# Patient Record
Sex: MALE | Race: WHITE | Employment: FULL TIME | URBAN - METROPOLITAN AREA
[De-identification: names, ages, dates, MRNs, and addresses within clinical notes are randomized per-mention and may not be internally consistent; named-entity substitution may affect disease eponyms.]

---

## 2017-01-24 ENCOUNTER — OFFICE VISIT (OUTPATIENT)
Dept: FAMILY MEDICINE CLINIC | Facility: CLINIC | Age: 42
End: 2017-01-24

## 2017-01-24 VITALS
HEART RATE: 72 BPM | DIASTOLIC BLOOD PRESSURE: 74 MMHG | RESPIRATION RATE: 12 BRPM | HEIGHT: 73 IN | WEIGHT: 217 LBS | SYSTOLIC BLOOD PRESSURE: 120 MMHG | BODY MASS INDEX: 28.76 KG/M2

## 2017-01-24 DIAGNOSIS — R42 VERTIGO: ICD-10-CM

## 2017-01-24 DIAGNOSIS — F43.9 STRESS: ICD-10-CM

## 2017-01-24 DIAGNOSIS — E78.00 PURE HYPERCHOLESTEROLEMIA: Primary | ICD-10-CM

## 2017-01-24 PROCEDURE — 99213 OFFICE O/P EST LOW 20 MIN: CPT | Performed by: NURSE PRACTITIONER

## 2017-01-24 RX ORDER — ATORVASTATIN CALCIUM 20 MG/1
TABLET, FILM COATED ORAL
Qty: 90 TABLET | Refills: 3 | Status: SHIPPED | OUTPATIENT
Start: 2017-01-24 | End: 2017-12-15

## 2017-01-24 NOTE — PROGRESS NOTES
Robert Mc is a 35year old male who presents for recheck of hyperlipidemia. Patient reports taking medications as instructed, no medication side effects noted. Denies any generalized muscle aches.  C/o intermittent vertigo over the past 1 month witho Wt 217 lb  BMI 28.64 kg/m2  GENERAL HEALTH: feels well otherwise  HEENT: see HPI, denies runny nose, nasal congestion or PND, no ear pain  RESPIRATORY: denies shortness of breath with exertion  CARDIOVASCULAR: denies chest pain on exertion, denies palpita benefits like:  · Decreasing your risk of health problems, such as heart disease, high blood pressure, diabetes, and some types of cancer. · Managing your weight. · Helping you sleep better.   · Preventing or relieving stress, depression, and back problem

## 2017-01-24 NOTE — PATIENT INSTRUCTIONS
Monitor for worsening vertigo symptoms. Consider ENT or neurology consult. Labs reviewed, stable. Resume exercise, try to get 7 hours of sleep night. Recommend annual physical next Fall.       Exercise: Why Fitness Matters  Starting an exercise program c

## 2017-02-21 ENCOUNTER — APPOINTMENT (OUTPATIENT)
Dept: OTHER | Facility: HOSPITAL | Age: 42
End: 2017-02-21
Attending: PREVENTIVE MEDICINE

## 2017-04-04 DIAGNOSIS — E78.00 PURE HYPERCHOLESTEROLEMIA: Primary | ICD-10-CM

## 2017-04-05 RX ORDER — ATORVASTATIN CALCIUM 20 MG/1
TABLET, FILM COATED ORAL
Qty: 90 TABLET | Refills: 0 | Status: SHIPPED | OUTPATIENT
Start: 2017-04-05 | End: 2017-07-19

## 2017-07-19 DIAGNOSIS — E78.00 PURE HYPERCHOLESTEROLEMIA: ICD-10-CM

## 2017-07-19 RX ORDER — ATORVASTATIN CALCIUM 20 MG/1
TABLET, FILM COATED ORAL
Qty: 90 TABLET | Refills: 1 | Status: SHIPPED | OUTPATIENT
Start: 2017-07-19 | End: 2017-12-15

## 2017-11-02 ENCOUNTER — APPOINTMENT (OUTPATIENT)
Dept: OTHER | Facility: HOSPITAL | Age: 42
End: 2017-11-02
Attending: PREVENTIVE MEDICINE

## 2017-12-04 ENCOUNTER — TELEPHONE (OUTPATIENT)
Dept: FAMILY MEDICINE CLINIC | Facility: CLINIC | Age: 42
End: 2017-12-04

## 2017-12-04 DIAGNOSIS — E78.00 PURE HYPERCHOLESTEROLEMIA: Primary | ICD-10-CM

## 2017-12-15 ENCOUNTER — OFFICE VISIT (OUTPATIENT)
Dept: FAMILY MEDICINE CLINIC | Facility: CLINIC | Age: 42
End: 2017-12-15

## 2017-12-15 VITALS
TEMPERATURE: 98 F | SYSTOLIC BLOOD PRESSURE: 118 MMHG | RESPIRATION RATE: 16 BRPM | DIASTOLIC BLOOD PRESSURE: 80 MMHG | WEIGHT: 217.38 LBS | HEIGHT: 73 IN | HEART RATE: 76 BPM | BODY MASS INDEX: 28.81 KG/M2

## 2017-12-15 DIAGNOSIS — Z00.00 ANNUAL PHYSICAL EXAM: Primary | ICD-10-CM

## 2017-12-15 DIAGNOSIS — M25.521 RIGHT ELBOW PAIN: ICD-10-CM

## 2017-12-15 DIAGNOSIS — E78.00 PURE HYPERCHOLESTEROLEMIA: ICD-10-CM

## 2017-12-15 PROCEDURE — 99396 PREV VISIT EST AGE 40-64: CPT | Performed by: FAMILY MEDICINE

## 2017-12-15 RX ORDER — ATORVASTATIN CALCIUM 20 MG/1
TABLET, FILM COATED ORAL
Qty: 90 TABLET | Refills: 3 | Status: SHIPPED | OUTPATIENT
Start: 2017-12-15 | End: 2018-12-21

## 2017-12-15 NOTE — PROGRESS NOTES
Patient presents with:  Physical: Here for annual exam and to review recent labs. HPI:   Familia Mcpherson is a 38year old male who presents for a complete physical exam. No significant concerns today, but R arm not feeling good for the last month.   Jonathon Rajan History:   Diagnosis Date   • Acute URI    • Internal hemorrhoids    • Kidney calculi 6/2015   • Lipid screening 2/25/2012    Done      Past Surgical History:  2007: COLONOSCOPY      Comment: dr. Isauro Cassidy, repeat at age 46   Family History   Problem Relation A PLAN:     Anna Harvey was seen in the office today:  had concerns including Physical (Here for annual exam and to review recent labs. ). 1. Annual physical exam  Overall healthy  Clean up diet some, regular exercise encouraged.   Goal weight is a li

## 2017-12-21 ENCOUNTER — HOSPITAL ENCOUNTER (OUTPATIENT)
Dept: GENERAL RADIOLOGY | Age: 42
Discharge: HOME | End: 2017-12-21
Attending: FAMILY MEDICINE
Payer: COMMERCIAL

## 2017-12-21 DIAGNOSIS — M25.521 RIGHT ELBOW PAIN: ICD-10-CM

## 2017-12-21 PROCEDURE — 73080 X-RAY EXAM OF ELBOW: CPT | Performed by: FAMILY MEDICINE

## 2018-11-28 ENCOUNTER — TELEPHONE (OUTPATIENT)
Dept: FAMILY MEDICINE CLINIC | Facility: CLINIC | Age: 43
End: 2018-11-28

## 2018-11-28 DIAGNOSIS — E78.00 PURE HYPERCHOLESTEROLEMIA: Primary | ICD-10-CM

## 2018-11-29 NOTE — TELEPHONE ENCOUNTER
LM for patient fasting lab orders, 10-12 hours, water only have been sent to [facility], please have them prior to your appt.

## 2018-12-21 ENCOUNTER — OFFICE VISIT (OUTPATIENT)
Dept: FAMILY MEDICINE CLINIC | Facility: CLINIC | Age: 43
End: 2018-12-21
Payer: COMMERCIAL

## 2018-12-21 VITALS
RESPIRATION RATE: 14 BRPM | TEMPERATURE: 98 F | SYSTOLIC BLOOD PRESSURE: 110 MMHG | HEART RATE: 76 BPM | DIASTOLIC BLOOD PRESSURE: 72 MMHG | HEIGHT: 73 IN | BODY MASS INDEX: 28.89 KG/M2 | WEIGHT: 218 LBS

## 2018-12-21 DIAGNOSIS — Z00.00 ANNUAL PHYSICAL EXAM: Primary | ICD-10-CM

## 2018-12-21 DIAGNOSIS — E78.00 PURE HYPERCHOLESTEROLEMIA: ICD-10-CM

## 2018-12-21 PROCEDURE — 99396 PREV VISIT EST AGE 40-64: CPT | Performed by: FAMILY MEDICINE

## 2018-12-21 RX ORDER — ATORVASTATIN CALCIUM 20 MG/1
TABLET, FILM COATED ORAL
Qty: 90 TABLET | Refills: 3 | Status: SHIPPED | OUTPATIENT
Start: 2018-12-21 | End: 2019-12-23

## 2018-12-21 NOTE — PROGRESS NOTES
Patient presents with:  Physical     HPI:   Lee Staton is a 37year old male who presents for a complete physical exam.     Last colonoscopy:  Has seen GI - recommend at 46. Last PSA:  n/a  Immunizations: TDaP 2015. HLD - on atorvastatin.    Re LBP[other]) Mother       Social History:  Social History    Tobacco Use      Smoking status: Never Smoker      Smokeless tobacco: Never Used    Alcohol use: Yes      Frequency: 2-4 times a month      Drinks per session: 1 or 2      Binge frequency: Never improved  Cholesterol controlled. Continue med. - atorvastatin 20 MG Oral Tab; TAKE 1 TABLET (20 MG TOTAL) BY MOUTH ONCE DAILY. Dispense: 90 tablet;  Refill: 3      RTC annually    Bran Pineda M.D.   [facility] 3  12/21/18

## 2019-12-11 ENCOUNTER — TELEPHONE (OUTPATIENT)
Dept: FAMILY MEDICINE CLINIC | Facility: CLINIC | Age: 44
End: 2019-12-11

## 2019-12-11 DIAGNOSIS — E78.00 PURE HYPERCHOLESTEROLEMIA: Primary | ICD-10-CM

## 2019-12-23 ENCOUNTER — OFFICE VISIT (OUTPATIENT)
Dept: FAMILY MEDICINE CLINIC | Facility: CLINIC | Age: 44
End: 2019-12-23
Payer: COMMERCIAL

## 2019-12-23 VITALS
TEMPERATURE: 98 F | HEIGHT: 73 IN | WEIGHT: 221 LBS | RESPIRATION RATE: 16 BRPM | HEART RATE: 96 BPM | SYSTOLIC BLOOD PRESSURE: 134 MMHG | DIASTOLIC BLOOD PRESSURE: 82 MMHG | BODY MASS INDEX: 29.29 KG/M2

## 2019-12-23 DIAGNOSIS — E78.00 PURE HYPERCHOLESTEROLEMIA: ICD-10-CM

## 2019-12-23 DIAGNOSIS — Z00.00 ANNUAL PHYSICAL EXAM: Primary | ICD-10-CM

## 2019-12-23 PROCEDURE — 99396 PREV VISIT EST AGE 40-64: CPT | Performed by: FAMILY MEDICINE

## 2019-12-23 RX ORDER — ATORVASTATIN CALCIUM 20 MG/1
TABLET, FILM COATED ORAL
Qty: 90 TABLET | Refills: 3 | Status: SHIPPED | OUTPATIENT
Start: 2019-12-23 | End: 2021-04-02

## 2019-12-23 NOTE — PROGRESS NOTES
Patient presents with: Well Adult: Annual physical     HPI:   Francisco Javier Viera is a 53year old male who presents for a complete physical exam.     Last colonoscopy:  N/a - at 48  Last PSA:  N/a - at 46.   Has started to notice a slowing of the urine strea Social History:  Social History    Tobacco Use      Smoking status: Never Smoker      Smokeless tobacco: Never Used    Alcohol use: Yes      Frequency: 2-4 times a month      Drinks per session: 1 or 2      Binge frequency: Never      Comment: 3 drinks a hypercholesterolemia  Lipids remain well controlled  Med refilled. - atorvastatin 20 MG Oral Tab; TAKE 1 TABLET (20 MG TOTAL) BY MOUTH ONCE DAILY. Dispense: 90 tablet;  Refill: 3      Chelsie Mejia M.D.   [facility] 3  12/23/19    Return in a

## 2021-03-17 DIAGNOSIS — E78.00 PURE HYPERCHOLESTEROLEMIA: ICD-10-CM

## 2021-03-17 NOTE — TELEPHONE ENCOUNTER
Requested Prescriptions     Pending Prescriptions Disp Refills   • atorvastatin 20 MG Oral Tab [Pharmacy Med Name: ATORVASTATIN 20 MG TABLET] 90 tablet 3     Sig: TAKE 1 TABLET BY MOUTH EVERY DAY     LOV 2019    Patient was asked to follow-up in: 1 year

## 2021-03-23 ENCOUNTER — TELEPHONE (OUTPATIENT)
Dept: FAMILY MEDICINE CLINIC | Facility: CLINIC | Age: 46
End: 2021-03-23

## 2021-03-23 DIAGNOSIS — Z00.00 LABORATORY EXAM ORDERED AS PART OF ROUTINE GENERAL MEDICAL EXAMINATION: ICD-10-CM

## 2021-03-23 DIAGNOSIS — E78.00 PURE HYPERCHOLESTEROLEMIA: Primary | ICD-10-CM

## 2021-03-23 RX ORDER — ATORVASTATIN CALCIUM 20 MG/1
TABLET, FILM COATED ORAL
Qty: 90 TABLET | Refills: 3 | OUTPATIENT
Start: 2021-03-23

## 2021-03-23 NOTE — TELEPHONE ENCOUNTER
Please enter lab orders for the patient's upcoming physical appointment. Physical scheduled:    Your appointments     Date & Time Appointment Department [facility] [place])    Apr 02, 2021  8:00 AM CDT Physical - Established with Navin Berry MD [address] [place] [address]

## 2021-03-23 NOTE — TELEPHONE ENCOUNTER
Called patient and scheduled physical for 04/02/21 with Dr. Todd Dawn, has enough medication to last him

## 2021-03-26 LAB
ABSOLUTE BASOPHILS: 31 CELLS/UL (ref 0–200)
ABSOLUTE EOSINOPHILS: 112 CELLS/UL (ref 15–500)
ABSOLUTE LYMPHOCYTES: 1593 CELLS/UL (ref 850–3900)
ABSOLUTE MONOCYTES: 657 CELLS/UL (ref 200–950)
ABSOLUTE NEUTROPHILS: 3807 CELLS/UL (ref 1500–7800)
ALBUMIN/GLOBULIN RATIO: 1.7 (CALC) (ref 1–2.5)
ALBUMIN: 4.5 G/DL (ref 3.6–5.1)
ALKALINE PHOSPHATASE: 61 U/L (ref 36–130)
ALT: 32 U/L (ref 9–46)
AST: 20 U/L (ref 10–40)
BASOPHILS: 0.5 %
BILIRUBIN, TOTAL: 1.5 MG/DL (ref 0.2–1.2)
BUN: 17 MG/DL (ref 7–25)
CALCIUM: 9.6 MG/DL (ref 8.6–10.3)
CARBON DIOXIDE: 29 MMOL/L (ref 20–32)
CHLORIDE: 105 MMOL/L (ref 98–110)
CHOL/HDLC RATIO: 2.9 (CALC)
CHOLESTEROL, TOTAL: 135 MG/DL
CREATININE: 1.22 MG/DL (ref 0.6–1.35)
EGFR IF AFRICN AM: 81 ML/MIN/1.73M2
EGFR IF NONAFRICN AM: 70 ML/MIN/1.73M2
EOSINOPHILS: 1.8 %
GLOBULIN: 2.6 G/DL (CALC) (ref 1.9–3.7)
GLUCOSE: 88 MG/DL (ref 65–99)
HDL CHOLESTEROL: 46 MG/DL
HEMATOCRIT: 47.4 % (ref 38.5–50)
HEMOGLOBIN: 15.6 G/DL (ref 13.2–17.1)
LDL-CHOLESTEROL: 66 MG/DL (CALC)
LYMPHOCYTES: 25.7 %
MCH: 28.2 PG (ref 27–33)
MCHC: 32.9 G/DL (ref 32–36)
MCV: 85.7 FL (ref 80–100)
MONOCYTES: 10.6 %
MPV: 10.2 FL (ref 7.5–12.5)
NEUTROPHILS: 61.4 %
NON-HDL CHOLESTEROL: 89 MG/DL (CALC)
PLATELET COUNT: 229 THOUSAND/UL (ref 140–400)
POTASSIUM: 3.9 MMOL/L (ref 3.5–5.3)
PROTEIN, TOTAL: 7.1 G/DL (ref 6.1–8.1)
RDW: 14.5 % (ref 11–15)
RED BLOOD CELL COUNT: 5.53 MILLION/UL (ref 4.2–5.8)
SODIUM: 141 MMOL/L (ref 135–146)
TRIGLYCERIDES: 155 MG/DL
WHITE BLOOD CELL COUNT: 6.2 THOUSAND/UL (ref 3.8–10.8)

## 2021-04-02 ENCOUNTER — OFFICE VISIT (OUTPATIENT)
Dept: FAMILY MEDICINE CLINIC | Facility: CLINIC | Age: 46
End: 2021-04-02
Payer: COMMERCIAL

## 2021-04-02 VITALS
BODY MASS INDEX: 28.54 KG/M2 | HEIGHT: 73.5 IN | WEIGHT: 220 LBS | OXYGEN SATURATION: 96 % | HEART RATE: 84 BPM | RESPIRATION RATE: 16 BRPM | SYSTOLIC BLOOD PRESSURE: 132 MMHG | TEMPERATURE: 98 F | DIASTOLIC BLOOD PRESSURE: 84 MMHG

## 2021-04-02 DIAGNOSIS — E78.00 PURE HYPERCHOLESTEROLEMIA: ICD-10-CM

## 2021-04-02 DIAGNOSIS — Z00.00 ANNUAL PHYSICAL EXAM: Primary | ICD-10-CM

## 2021-04-02 PROCEDURE — 99396 PREV VISIT EST AGE 40-64: CPT | Performed by: FAMILY MEDICINE

## 2021-04-02 PROCEDURE — 3008F BODY MASS INDEX DOCD: CPT | Performed by: FAMILY MEDICINE

## 2021-04-02 PROCEDURE — 3079F DIAST BP 80-89 MM HG: CPT | Performed by: FAMILY MEDICINE

## 2021-04-02 PROCEDURE — 3075F SYST BP GE 130 - 139MM HG: CPT | Performed by: FAMILY MEDICINE

## 2021-04-02 RX ORDER — ATORVASTATIN CALCIUM 20 MG/1
TABLET, FILM COATED ORAL
Qty: 90 TABLET | Refills: 3 | Status: SHIPPED | OUTPATIENT
Start: 2021-04-02

## 2021-04-02 NOTE — PROGRESS NOTES
Patient presents with:  Physical: Annual     HPI:   Grace Serra is a 50year old male who presents for a complete physical exam. Feeling pretty well overall. Last colonoscopy:  N/a -at 48 (43?)  Last PSA:  N/a - at 48. Immunizations: TDaP 2015. 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 : yes - [employer]. Children: 3: 10yo Karthikeyan, 12, 17yo.     Ex

## 2021-10-27 ENCOUNTER — OFFICE VISIT (OUTPATIENT)
Dept: FAMILY MEDICINE CLINIC | Facility: CLINIC | Age: 46
End: 2021-10-27
Payer: COMMERCIAL

## 2021-10-27 VITALS
SYSTOLIC BLOOD PRESSURE: 134 MMHG | HEIGHT: 73 IN | DIASTOLIC BLOOD PRESSURE: 64 MMHG | HEART RATE: 86 BPM | WEIGHT: 223.19 LBS | RESPIRATION RATE: 18 BRPM | BODY MASS INDEX: 29.58 KG/M2

## 2021-10-27 DIAGNOSIS — R07.89 CHEST WALL PAIN: Primary | ICD-10-CM

## 2021-10-27 PROCEDURE — 3008F BODY MASS INDEX DOCD: CPT | Performed by: FAMILY MEDICINE

## 2021-10-27 PROCEDURE — 3075F SYST BP GE 130 - 139MM HG: CPT | Performed by: FAMILY MEDICINE

## 2021-10-27 PROCEDURE — 99213 OFFICE O/P EST LOW 20 MIN: CPT | Performed by: FAMILY MEDICINE

## 2021-10-27 PROCEDURE — 3078F DIAST BP <80 MM HG: CPT | Performed by: FAMILY MEDICINE

## 2021-10-27 NOTE — PROGRESS NOTES
Olivia Acuña is a 50year old male coming in for had concerns including Pain (on rib cage on right side, can really feel the pain when bending or streaching, more of a dull achy pain ).     Subjective:   HPI weeks of right chest wall pain, wots with twisting, occ

## 2022-08-05 DIAGNOSIS — E78.00 PURE HYPERCHOLESTEROLEMIA: ICD-10-CM

## 2022-08-08 NOTE — TELEPHONE ENCOUNTER
LM for patient to schedule his physical with Dr. Miller Martínez or one of the Mid levels he is overdue from April and we will make sure he has enough medication to get to the appointment

## 2022-08-10 ENCOUNTER — TELEPHONE (OUTPATIENT)
Dept: FAMILY MEDICINE CLINIC | Facility: CLINIC | Age: 47
End: 2022-08-10

## 2022-08-10 DIAGNOSIS — Z13.0 SCREENING FOR BLOOD DISEASE: Primary | ICD-10-CM

## 2022-08-10 DIAGNOSIS — Z13.29 SCREENING FOR THYROID DISORDER: ICD-10-CM

## 2022-08-10 DIAGNOSIS — Z12.5 SCREENING FOR PROSTATE CANCER: ICD-10-CM

## 2022-08-10 DIAGNOSIS — Z13.220 SCREENING FOR LIPID DISORDERS: ICD-10-CM

## 2022-08-10 DIAGNOSIS — Z13.228 SCREENING FOR METABOLIC DISORDER: ICD-10-CM

## 2022-08-10 NOTE — TELEPHONE ENCOUNTER
Please enter lab orders for the patient's upcoming physical appointment. Physical scheduled: Your appointments     Date & Time Appointment Department [facility])    Aug 30, 2022  8:00 AM CDT Physical - Established with ISIS [address], [address], [place]  (Caron Pelaez)            [address] Candice Moise [address] [facility] [phone]         Preferred lab: [facility]     The patient has been notified to complete fasting labs prior to their physical appointment.

## 2022-08-22 LAB
ABSOLUTE BASOPHILS: 42 CELLS/UL (ref 0–200)
ABSOLUTE EOSINOPHILS: 88 CELLS/UL (ref 15–500)
ABSOLUTE LYMPHOCYTES: 1134 CELLS/UL (ref 850–3900)
ABSOLUTE MONOCYTES: 437 CELLS/UL (ref 200–950)
ABSOLUTE NEUTROPHILS: 3500 CELLS/UL (ref 1500–7800)
ALBUMIN/GLOBULIN RATIO: 1.9 (CALC) (ref 1–2.5)
ALBUMIN: 4.6 G/DL (ref 3.6–5.1)
ALKALINE PHOSPHATASE: 67 U/L (ref 36–130)
ALT: 31 U/L (ref 9–46)
AST: 18 U/L (ref 10–40)
BASOPHILS: 0.8 %
BILIRUBIN, TOTAL: 1.3 MG/DL (ref 0.2–1.2)
BUN: 18 MG/DL (ref 7–25)
CALCIUM: 9.4 MG/DL (ref 8.6–10.3)
CARBON DIOXIDE: 28 MMOL/L (ref 20–32)
CHLORIDE: 106 MMOL/L (ref 98–110)
CHOL/HDLC RATIO: 3 (CALC)
CHOLESTEROL, TOTAL: 143 MG/DL
CREATININE: 1.2 MG/DL (ref 0.6–1.29)
EGFR: 75 ML/MIN/1.73M2
EOSINOPHILS: 1.7 %
GLOBULIN: 2.4 G/DL (CALC) (ref 1.9–3.7)
GLUCOSE: 86 MG/DL (ref 65–99)
HDL CHOLESTEROL: 47 MG/DL
HEMATOCRIT: 45.5 % (ref 38.5–50)
HEMOGLOBIN: 15.2 G/DL (ref 13.2–17.1)
LDL-CHOLESTEROL: 75 MG/DL (CALC)
LYMPHOCYTES: 21.8 %
MCH: 29.1 PG (ref 27–33)
MCHC: 33.4 G/DL (ref 32–36)
MCV: 87 FL (ref 80–100)
MONOCYTES: 8.4 %
MPV: 10.1 FL (ref 7.5–12.5)
NEUTROPHILS: 67.3 %
NON-HDL CHOLESTEROL: 96 MG/DL (CALC)
PLATELET COUNT: 212 THOUSAND/UL (ref 140–400)
POTASSIUM: 4.5 MMOL/L (ref 3.5–5.3)
PROTEIN, TOTAL: 7 G/DL (ref 6.1–8.1)
RDW: 14.3 % (ref 11–15)
RED BLOOD CELL COUNT: 5.23 MILLION/UL (ref 4.2–5.8)
SODIUM: 142 MMOL/L (ref 135–146)
TOTAL PSA: 0.8 NG/ML
TRIGLYCERIDES: 126 MG/DL
TSH W/REFLEX TO FT4: 3.54 MIU/L (ref 0.4–4.5)
WHITE BLOOD CELL COUNT: 5.2 THOUSAND/UL (ref 3.8–10.8)

## 2022-08-22 RX ORDER — ATORVASTATIN CALCIUM 20 MG/1
TABLET, FILM COATED ORAL
Qty: 90 TABLET | Refills: 3 | Status: SHIPPED | OUTPATIENT
Start: 2022-08-22

## 2022-09-15 ENCOUNTER — OFFICE VISIT (OUTPATIENT)
Dept: FAMILY MEDICINE CLINIC | Facility: CLINIC | Age: 47
End: 2022-09-15
Payer: COMMERCIAL

## 2022-09-15 VITALS
RESPIRATION RATE: 18 BRPM | HEART RATE: 74 BPM | BODY MASS INDEX: 28.94 KG/M2 | DIASTOLIC BLOOD PRESSURE: 86 MMHG | WEIGHT: 218.38 LBS | SYSTOLIC BLOOD PRESSURE: 134 MMHG | OXYGEN SATURATION: 98 % | TEMPERATURE: 97 F | HEIGHT: 73 IN

## 2022-09-15 DIAGNOSIS — E78.00 PURE HYPERCHOLESTEROLEMIA: ICD-10-CM

## 2022-09-15 DIAGNOSIS — L98.9 SKIN LESION: ICD-10-CM

## 2022-09-15 DIAGNOSIS — Z00.00 ROUTINE PHYSICAL EXAMINATION: Primary | ICD-10-CM

## 2022-09-15 DIAGNOSIS — M79.644 PAIN OF FINGER OF RIGHT HAND: ICD-10-CM

## 2022-09-15 PROCEDURE — 3079F DIAST BP 80-89 MM HG: CPT | Performed by: NURSE PRACTITIONER

## 2022-09-15 PROCEDURE — 3008F BODY MASS INDEX DOCD: CPT | Performed by: NURSE PRACTITIONER

## 2022-09-15 PROCEDURE — 3075F SYST BP GE 130 - 139MM HG: CPT | Performed by: NURSE PRACTITIONER

## 2022-09-15 PROCEDURE — 99396 PREV VISIT EST AGE 40-64: CPT | Performed by: NURSE PRACTITIONER

## 2022-12-08 NOTE — TELEPHONE ENCOUNTER
LOV 9/26/22. Next office visit 1/6/23. Passed refill protocol. Courtesy 30 day refill sent.   Please enter lab orders for the patient's upcoming physical appointment. Route back to . Physical scheduled:    Your appointments     Date & Time Appointment Department [facility])    Dec 23, 2019 10:00 AM CST Physical - Established with Jamal

## 2023-10-31 ENCOUNTER — TELEPHONE (OUTPATIENT)
Dept: FAMILY MEDICINE CLINIC | Facility: CLINIC | Age: 48
End: 2023-10-31

## 2023-10-31 DIAGNOSIS — Z00.00 LABORATORY EXAM ORDERED AS PART OF ROUTINE GENERAL MEDICAL EXAMINATION: ICD-10-CM

## 2023-10-31 DIAGNOSIS — E78.00 PURE HYPERCHOLESTEROLEMIA: Primary | ICD-10-CM

## 2023-10-31 NOTE — TELEPHONE ENCOUNTER
Please enter lab orders for the patient's upcoming physical appointment. Physical scheduled: Your appointments       Date & Time Appointment Department [facility])    Dec 08, 2023  8:30 AM CST Physical - Established with Clay Valera MD [address], [address] [address] [place] [address], [place] (Mikael Simpson)              [address] [address] [phone]           Preferred lab: [facility]     The patient has been notified to complete fasting labs prior to their physical appointment.

## 2023-12-01 LAB
ABSOLUTE BASOPHILS: 31 CELLS/UL (ref 0–200)
ABSOLUTE EOSINOPHILS: 98 CELLS/UL (ref 15–500)
ABSOLUTE LYMPHOCYTES: 1257 CELLS/UL (ref 850–3900)
ABSOLUTE MONOCYTES: 598 CELLS/UL (ref 200–950)
ABSOLUTE NEUTROPHILS: 4118 CELLS/UL (ref 1500–7800)
ALBUMIN/GLOBULIN RATIO: 1.7 (CALC) (ref 1–2.5)
ALBUMIN: 4.3 G/DL (ref 3.6–5.1)
ALKALINE PHOSPHATASE: 56 U/L (ref 35–144)
ALT: 43 U/L (ref 9–46)
AST: 18 U/L (ref 10–35)
BASOPHILS: 0.5 %
BILIRUBIN, TOTAL: 1.3 MG/DL (ref 0.2–1.2)
BUN: 18 MG/DL (ref 7–25)
CALCIUM: 9.3 MG/DL (ref 8.6–10.3)
CARBON DIOXIDE: 28 MMOL/L (ref 20–32)
CHLORIDE: 106 MMOL/L (ref 98–110)
CHOL/HDLC RATIO: 3.5 (CALC)
CHOLESTEROL, TOTAL: 134 MG/DL
CREATININE: 1.14 MG/DL (ref 0.7–1.3)
EGFR: 78 ML/MIN/1.73M2
EOSINOPHILS: 1.6 %
GLOBULIN: 2.6 G/DL (CALC) (ref 1.9–3.7)
GLUCOSE: 91 MG/DL (ref 65–99)
HDL CHOLESTEROL: 38 MG/DL
HEMATOCRIT: 44.6 % (ref 38.5–50)
HEMOGLOBIN A1C: 5.6 % OF TOTAL HGB
HEMOGLOBIN: 14.9 G/DL (ref 13.2–17.1)
LDL-CHOLESTEROL: 71 MG/DL (CALC)
LYMPHOCYTES: 20.6 %
MCH: 29 PG (ref 27–33)
MCHC: 33.4 G/DL (ref 32–36)
MCV: 86.8 FL (ref 80–100)
MONOCYTES: 9.8 %
MPV: 9.7 FL (ref 7.5–12.5)
NEUTROPHILS: 67.5 %
NON-HDL CHOLESTEROL: 96 MG/DL (CALC)
PLATELET COUNT: 294 THOUSAND/UL (ref 140–400)
POTASSIUM: 3.9 MMOL/L (ref 3.5–5.3)
PROTEIN, TOTAL: 6.9 G/DL (ref 6.1–8.1)
RDW: 14 % (ref 11–15)
RED BLOOD CELL COUNT: 5.14 MILLION/UL (ref 4.2–5.8)
SODIUM: 141 MMOL/L (ref 135–146)
TRIGLYCERIDES: 173 MG/DL
TSH W/REFLEX TO FT4: 3.52 MIU/L (ref 0.4–4.5)
WHITE BLOOD CELL COUNT: 6.1 THOUSAND/UL (ref 3.8–10.8)

## 2023-12-08 ENCOUNTER — OFFICE VISIT (OUTPATIENT)
Dept: FAMILY MEDICINE CLINIC | Facility: CLINIC | Age: 48
End: 2023-12-08
Payer: COMMERCIAL

## 2023-12-08 VITALS
SYSTOLIC BLOOD PRESSURE: 144 MMHG | OXYGEN SATURATION: 96 % | BODY MASS INDEX: 29.57 KG/M2 | HEART RATE: 90 BPM | WEIGHT: 223.13 LBS | HEIGHT: 73 IN | RESPIRATION RATE: 16 BRPM | DIASTOLIC BLOOD PRESSURE: 95 MMHG

## 2023-12-08 DIAGNOSIS — R23.9 SKIN CHANGE: ICD-10-CM

## 2023-12-08 DIAGNOSIS — E78.00 PURE HYPERCHOLESTEROLEMIA: ICD-10-CM

## 2023-12-08 DIAGNOSIS — Z12.11 COLON CANCER SCREENING: ICD-10-CM

## 2023-12-08 DIAGNOSIS — Z00.00 ANNUAL PHYSICAL EXAM: Primary | ICD-10-CM

## 2023-12-08 DIAGNOSIS — I10 ESSENTIAL HYPERTENSION: ICD-10-CM

## 2023-12-08 RX ORDER — ATORVASTATIN CALCIUM 20 MG/1
TABLET, FILM COATED ORAL
Qty: 90 TABLET | Refills: 3 | Status: SHIPPED | OUTPATIENT
Start: 2023-12-08

## 2023-12-08 RX ORDER — HYDROCHLOROTHIAZIDE 25 MG/1
25 TABLET ORAL DAILY
Qty: 90 TABLET | Refills: 0 | Status: SHIPPED | OUTPATIENT
Start: 2023-12-08

## 2024-03-04 DIAGNOSIS — I10 ESSENTIAL HYPERTENSION: ICD-10-CM

## 2024-03-09 RX ORDER — HYDROCHLOROTHIAZIDE 25 MG/1
25 TABLET ORAL DAILY
Qty: 90 TABLET | Refills: 0 | Status: SHIPPED | OUTPATIENT
Start: 2024-03-09

## 2024-03-09 NOTE — TELEPHONE ENCOUNTER
Requested Prescriptions     Pending Prescriptions Disp Refills    HYDROCHLOROTHIAZIDE 25 MG Oral Tab [Pharmacy Med Name: HYDROCHLOROTHIAZIDE 25 MG TAB] 90 tablet 0     Sig: TAKE 1 TABLET (25 MG TOTAL) BY MOUTH DAILY.     LOV 12/8/2023     Patient was asked to follow-up in: 2 months     Appointment scheduled: 3/11/2024 Malcolm Jansen MD     Medication refilled per protocol.

## 2024-03-11 ENCOUNTER — OFFICE VISIT (OUTPATIENT)
Dept: FAMILY MEDICINE CLINIC | Facility: CLINIC | Age: 49
End: 2024-03-11
Payer: COMMERCIAL

## 2024-03-11 VITALS
OXYGEN SATURATION: 96 % | WEIGHT: 224.25 LBS | HEART RATE: 75 BPM | BODY MASS INDEX: 29.72 KG/M2 | RESPIRATION RATE: 16 BRPM | HEIGHT: 73 IN | DIASTOLIC BLOOD PRESSURE: 88 MMHG | SYSTOLIC BLOOD PRESSURE: 138 MMHG

## 2024-03-11 DIAGNOSIS — I10 ESSENTIAL HYPERTENSION: Primary | ICD-10-CM

## 2024-03-11 PROCEDURE — 99213 OFFICE O/P EST LOW 20 MIN: CPT | Performed by: FAMILY MEDICINE

## 2024-03-11 RX ORDER — VALSARTAN 80 MG/1
80 TABLET ORAL DAILY
Qty: 90 TABLET | Refills: 0 | Status: SHIPPED | OUTPATIENT
Start: 2024-03-11

## 2024-03-11 NOTE — PROGRESS NOTES
Chief Complaint   Patient presents with    Blood Pressure     Patient here for blood pressure follow up      HPI:   Yang Horn is a 48 year old male who presents to the office for BP recheck  At LOV in Dec, BP was 144/95 and 140/100.    We started him on HCTZ 25mg daily.  Started the medicine and HA went away immediatly.  No HA since.    BP not responding as well as we were hoping.   120-140/80-90s.      Ran out of BP pills last week and BP trending up, and HAs returned.     Watching salt in diet.  Eats out for lunch each work day.  No added salt.      REVIEW OF SYSTEMS:   Pertinent items are noted in HPI.  EXAM:   /90   Pulse 75   Resp 16   Ht 6' 1\" (1.854 m)   Wt 224 lb 4 oz (101.7 kg)   SpO2 96%   BMI 29.59 kg/m²     General appearance - alert, well appearing, and in no distress  Chest - clear to auscultation, no wheezes, rales or rhonchi, symmetric air entry  Heart - normal rate, regular rhythm, normal S1, S2, no murmurs, rubs, clicks or gallops  Extremities - peripheral pulses normal, no pedal edema, no clubbing or cyanosis  ASSESSMENT AND PLAN:     Yang Horn was seen in the office today:  had concerns including Blood Pressure (Patient here for blood pressure follow up).    1. Essential hypertension  BP better, but still high normal or elevated  Will add valsartan to the HCTZ 25mg.   In 3 months, he will run out of the HCTZ.  Will try to do a HCTZ-valsartan combo at that time, making sure the HCTZ dose is 25mg.   - valsartan 80 MG Oral Tab; Take 1 tablet (80 mg total) by mouth daily.  Dispense: 90 tablet; Refill: 0      Malcolm Jansen M.D.   EM[facility] 3  03/11/24

## 2024-04-20 NOTE — TELEPHONE ENCOUNTER
Labs have been ordered. Please get them fasting prior to the appt.   thanks Simple: Patient demonstrates quick and easy understanding

## 2024-06-04 DIAGNOSIS — I10 ESSENTIAL HYPERTENSION: ICD-10-CM

## 2024-06-05 RX ORDER — HYDROCHLOROTHIAZIDE 25 MG/1
25 TABLET ORAL DAILY
Qty: 90 TABLET | Refills: 3 | Status: SHIPPED | OUTPATIENT
Start: 2024-06-05

## 2024-06-05 NOTE — TELEPHONE ENCOUNTER
Requested Prescriptions     Pending Prescriptions Disp Refills    HYDROCHLOROTHIAZIDE 25 MG Oral Tab [Pharmacy Med Name: HYDROCHLOROTHIAZIDE 25 MG TAB] 90 tablet 0     Sig: TAKE 1 TABLET (25 MG TOTAL) BY MOUTH DAILY.     LOV 3/11/2024     Patient was asked to follow-up in:  Dec    Appointment scheduled: Visit date not found     Medication refilled per protocol.    Please advise per last note 3/11/24.    1. Essential hypertension  BP better, but still high normal or elevated  Will add valsartan to the HCTZ 25mg.   In 3 months, he will run out of the HCTZ.  Will try to do a HCTZ-valsartan combo at that time, making sure the HCTZ dose is 25mg.   - valsartan 80 MG Oral Tab; Take 1 tablet (80 mg total) by mouth daily.  Dispense: 90 tablet; Refill: 0

## 2024-06-09 DIAGNOSIS — I10 ESSENTIAL HYPERTENSION: ICD-10-CM

## 2024-06-11 RX ORDER — VALSARTAN 80 MG/1
80 TABLET ORAL DAILY
Qty: 90 TABLET | Refills: 1 | Status: SHIPPED | OUTPATIENT
Start: 2024-06-11

## 2024-06-11 NOTE — TELEPHONE ENCOUNTER
Requested Prescriptions     Pending Prescriptions Disp Refills    VALSARTAN 80 MG Oral Tab [Pharmacy Med Name: VALSARTAN 80 MG TABLET] 90 tablet 0     Sig: TAKE 1 TABLET BY MOUTH EVERY DAY     LOV 3/11/2024     Patient was asked to follow-up in:  [facility]    Appointment scheduled: Visit date not found     Medication refilled per protocol.

## 2024-11-28 DIAGNOSIS — E78.00 PURE HYPERCHOLESTEROLEMIA: ICD-10-CM

## 2024-12-03 RX ORDER — ATORVASTATIN CALCIUM 20 MG/1
TABLET, FILM COATED ORAL
Qty: 90 TABLET | Refills: 3 | Status: SHIPPED | OUTPATIENT
Start: 2024-12-03

## 2024-12-03 NOTE — TELEPHONE ENCOUNTER
Requested Prescriptions     Pending Prescriptions Disp Refills    ATORVASTATIN 20 MG Oral Tab [Pharmacy Med Name: ATORVASTATIN 20 MG TABLET] 90 tablet 3     Sig: TAKE 1 TABLET BY MOUTH EVERY DAY     LOV 3/11/2024     Patient was asked to follow-up in: return in dec for annual     Appointment scheduled: Visit date not found     Medication refilled per protocol.

## 2025-05-19 NOTE — TELEPHONE ENCOUNTER
LMOM informing pt that his 12 hr fasting labs have been placed to [facility] recovery. VS stable on transfer and in recovery. Handoff report received from procedural team. Discharge instructions reviewed with patient. Handouts given. Verbalized understanding. Questions encouraged and answered. PIV removed before DC.

## 2025-05-27 DIAGNOSIS — I10 ESSENTIAL HYPERTENSION: ICD-10-CM

## 2025-05-27 RX ORDER — HYDROCHLOROTHIAZIDE 25 MG/1
25 TABLET ORAL DAILY
Qty: 30 TABLET | Refills: 0 | Status: SHIPPED | OUTPATIENT
Start: 2025-05-27

## 2025-05-27 NOTE — TELEPHONE ENCOUNTER
Requested Prescriptions     Pending Prescriptions Disp Refills    HYDROCHLOROTHIAZIDE 25 MG Oral Tab [Pharmacy Med Name: HYDROCHLOROTHIAZIDE 25 MG TAB] 90 tablet 3     Sig: TAKE 1 TABLET (25 MG TOTAL) BY MOUTH DAILY.     LOV 3/11/24    Patient was asked to follow-up in: December 2024    Appointment scheduled: Visit date not found     Medication refilled per protocol.

## 2025-06-24 ENCOUNTER — PATIENT MESSAGE (OUTPATIENT)
Dept: FAMILY MEDICINE CLINIC | Facility: CLINIC | Age: 50
End: 2025-06-24

## 2025-06-24 ENCOUNTER — PATIENT OUTREACH (OUTPATIENT)
Dept: FAMILY MEDICINE CLINIC | Facility: CLINIC | Age: 50
End: 2025-06-24

## 2025-06-24 DIAGNOSIS — Z00.00 LABORATORY EXAM ORDERED AS PART OF ROUTINE GENERAL MEDICAL EXAMINATION: Primary | ICD-10-CM

## 2025-06-24 DIAGNOSIS — I10 ESSENTIAL HYPERTENSION: ICD-10-CM

## 2025-06-26 RX ORDER — HYDROCHLOROTHIAZIDE 25 MG/1
25 TABLET ORAL DAILY
Qty: 90 TABLET | Refills: 3 | Status: SHIPPED | OUTPATIENT
Start: 2025-06-26

## 2025-07-02 LAB
ABSOLUTE BASOPHILS: 23 CELLS/UL (ref 0–200)
ABSOLUTE EOSINOPHILS: 152 CELLS/UL (ref 15–500)
ABSOLUTE LYMPHOCYTES: 988 CELLS/UL (ref 850–3900)
ABSOLUTE MONOCYTES: 570 CELLS/UL (ref 200–950)
ABSOLUTE NEUTROPHILS: 5867 CELLS/UL (ref 1500–7800)
ALBUMIN/GLOBULIN RATIO: 1.9 (CALC) (ref 1–2.5)
ALBUMIN: 4.4 G/DL (ref 3.6–5.1)
ALKALINE PHOSPHATASE: 47 U/L (ref 35–144)
ALT: 37 U/L (ref 9–46)
AST: 26 U/L (ref 10–35)
BASOPHILS: 0.3 %
BILIRUBIN, TOTAL: 1.1 MG/DL (ref 0.2–1.2)
BUN: 18 MG/DL (ref 7–25)
CALCIUM: 9.3 MG/DL (ref 8.6–10.3)
CARBON DIOXIDE: 27 MMOL/L (ref 20–32)
CHLORIDE: 104 MMOL/L (ref 98–110)
CHOL/HDLC RATIO: 3.3 (CALC)
CHOLESTEROL, TOTAL: 135 MG/DL
CREATININE: 1.23 MG/DL (ref 0.7–1.3)
EGFR: 71 ML/MIN/1.73M2
EOSINOPHILS: 2 %
GLOBULIN: 2.3 G/DL (CALC) (ref 1.9–3.7)
GLUCOSE: 93 MG/DL (ref 65–99)
HDL CHOLESTEROL: 41 MG/DL
HEMATOCRIT: 40.4 % (ref 38.5–50)
HEMOGLOBIN A1C: 5.6 %
HEMOGLOBIN: 13 G/DL (ref 13.2–17.1)
LDL-CHOLESTEROL: 68 MG/DL (CALC)
LYMPHOCYTES: 13 %
MCH: 29.1 PG (ref 27–33)
MCHC: 32.2 G/DL (ref 32–36)
MCV: 90.6 FL (ref 80–100)
MONOCYTES: 7.5 %
MPV: 10 FL (ref 7.5–12.5)
NEUTROPHILS: 77.2 %
NON-HDL CHOLESTEROL: 94 MG/DL (CALC)
PLATELET COUNT: 229 THOUSAND/UL (ref 140–400)
POTASSIUM: 3.6 MMOL/L (ref 3.5–5.3)
PROTEIN, TOTAL: 6.7 G/DL (ref 6.1–8.1)
RDW: 14 % (ref 11–15)
RED BLOOD CELL COUNT: 4.46 MILLION/UL (ref 4.2–5.8)
SODIUM: 142 MMOL/L (ref 135–146)
TOTAL PSA: 0.8 NG/ML
TRIGLYCERIDES: 184 MG/DL
TSH W/REFLEX TO FT4: 2.28 MIU/L (ref 0.4–4.5)
WHITE BLOOD CELL COUNT: 7.6 THOUSAND/UL (ref 3.8–10.8)

## 2025-07-03 ENCOUNTER — OFFICE VISIT (OUTPATIENT)
Dept: FAMILY MEDICINE CLINIC | Facility: CLINIC | Age: 50
End: 2025-07-03
Payer: COMMERCIAL

## 2025-07-03 VITALS
SYSTOLIC BLOOD PRESSURE: 138 MMHG | RESPIRATION RATE: 16 BRPM | HEIGHT: 73 IN | OXYGEN SATURATION: 95 % | BODY MASS INDEX: 30.35 KG/M2 | DIASTOLIC BLOOD PRESSURE: 88 MMHG | WEIGHT: 229 LBS | HEART RATE: 85 BPM

## 2025-07-03 DIAGNOSIS — E78.00 PURE HYPERCHOLESTEROLEMIA: ICD-10-CM

## 2025-07-03 DIAGNOSIS — I10 ESSENTIAL HYPERTENSION: ICD-10-CM

## 2025-07-03 DIAGNOSIS — Z00.00 ANNUAL PHYSICAL EXAM: Primary | ICD-10-CM

## 2025-07-03 DIAGNOSIS — Z12.11 COLON CANCER SCREENING: ICD-10-CM

## 2025-07-03 PROCEDURE — 99213 OFFICE O/P EST LOW 20 MIN: CPT | Performed by: FAMILY MEDICINE

## 2025-07-03 PROCEDURE — 99396 PREV VISIT EST AGE 40-64: CPT | Performed by: FAMILY MEDICINE

## 2025-07-03 RX ORDER — LISINOPRIL 5 MG/1
5 TABLET ORAL DAILY
Qty: 90 TABLET | Refills: 0 | Status: SHIPPED | OUTPATIENT
Start: 2025-07-03

## 2025-07-03 NOTE — PROGRESS NOTES
Chief Complaint   Patient presents with    Physical     Patient here for physical       HPI:   Yang Horn is a 49 year old male who presents for a complete physical exam.     Last colonoscopy:  7/2009.  Due again.  Interested in c-scope.   Last PSA:  0.8.  Immunizations: TDaP 2020.      HTN - Taking hydrochlorothiazide 25 mg regularly.  Also has valsartan 80 mg.  BP controlled today - 120/80.   When taking the valsartan, he felt forgetful, clumsy.  He stopped the med.  140-150/.  Normal today though.  Working on picking up the exercise.  HTN runs in the family.    HAs - he believes related to the blood pressure elevation.      HLD - on statin.  Lipids at goal. TG still just a bit elevated.      Anemia - did blood donation  a week prior.      Skin - cherry angiomas - scattered on chest.      Abdomen - ventral hernia.      Wt Readings from Last 6 Encounters:   07/03/25 229 lb (103.9 kg)   03/11/24 224 lb 4 oz (101.7 kg)   12/08/23 223 lb 2 oz (101.2 kg)   09/15/22 218 lb 6.4 oz (99.1 kg)   10/27/21 223 lb 3.2 oz (101.2 kg)   04/02/21 220 lb (99.8 kg)     Body mass index is 30.21 kg/m².     Chemistry Labs:   Lab Results   Component Value Date/Time    GLU 93 07/01/2025 07:24 AM     07/01/2025 07:24 AM    K 3.6 07/01/2025 07:24 AM     07/01/2025 07:24 AM    CO2 27 07/01/2025 07:24 AM    CREATSERUM 1.23 07/01/2025 07:24 AM    CA 9.3 07/01/2025 07:24 AM    ALB 4.4 07/01/2025 07:24 AM    TP 6.7 07/01/2025 07:24 AM    ALKPHO 47 07/01/2025 07:24 AM    AST 26 07/01/2025 07:24 AM    ALT 37 07/01/2025 07:24 AM    BILT 1.1 07/01/2025 07:24 AM          Cholesterol  (most recent labs)   Lab Results   Component Value Date/Time    CHOLEST 135 07/01/2025 07:24 AM    HDL 41 07/01/2025 07:24 AM    LDL 68 07/01/2025 07:24 AM    TRIG 184 (H) 07/01/2025 07:24 AM      No results found for: \"PSA\"      Current Medications[1]   Past Medical History[2]   Past Surgical History[3]   Family History[4]   Social  History:  Short Social Hx on File[5]     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    : yes. Children: 3.   Exercise: yard work, playing with children.    Diet: irregular.       REVIEW OF SYSTEMS:     All systems reviewed, negative other than noted above.    EXAM:   /80   Pulse 85   Resp 16   Ht 6' 1\" (1.854 m)   Wt 229 lb (103.9 kg)   SpO2 95%   BMI 30.21 kg/m²   Body mass index is 30.21 kg/m².     General appearance: alert, appears stated age and cooperative  Eyes: conjunctivae/corneas clear. PERRL, EOM's intact.   Ears: normal TM's and external ear canals both ears  Neck: no adenopathy, no JVD, supple, symmetrical, trachea midline and thyroid not enlarged, symmetric, no tenderness/mass/nodules  Lungs: clear to auscultation bilaterally  Heart: S1, S2 normal, no murmur, click, rub or gallop, regular rate and rhythm  Abdomen: soft, non-tender; bowel sounds normal; no masses,  no organomegaly  Extremities: extremities normal, atraumatic, no cyanosis or edema  Pulses: 2+ and symmetric  Neurologic: Alert and oriented X 3, normal strength and tone. Normal symmetric reflexes. Normal coordination and gait     ASSESSMENT AND PLAN:     Yang Horn was seen in the office today:  had concerns including Physical (Patient here for physical ).    1. Annual physical exam  Overall fair  Biggest issue today is the BP, headaches  C-scope due - referral placed  PSA normal.     2. Essential hypertension  BP running high normal at home.  138/88 on repeat here.   Just on HCTZ - did not tolerate valsartan well.   Headaches occurring in the AM.    Will start lisinopril in addition to the HCTZ.   Report BPs later this month, and if HAs are resolved.    - lisinopril 5 MG Oral Tab; Take 1 tablet (5 mg total) by mouth daily.  Dispense: 90 tablet; Refill: 0    3. Pure hypercholesterolemia  On statin  Lipids controlled.  Stable.     4. Colon cancer screening  Due  Referral placed  - GASTRO - INTERNAL          Malcolm  Inderjit Jansen M.D.   [facility] 3  07/03/25        Note to patient: The 21 Century Cures Act makes medical notes like these available to patients in the interest of transparency. However, be advised this is a medical document. It is intended as peer to peer communication. It is written in medical language and may contain abbreviations or verbiage that are unfamiliar. It may appear blunt or direct. Medical documents are intended to carry relevant information, facts as evident, and the clinical opinion of the practitioner.            [1]   Current Outpatient Medications   Medication Sig Dispense Refill    HYDROCHLOROTHIAZIDE 25 MG Oral Tab TAKE 1 TABLET (25 MG TOTAL) BY MOUTH DAILY. 90 tablet 3    ATORVASTATIN 20 MG Oral Tab TAKE 1 TABLET BY MOUTH EVERY DAY 90 tablet 3    valsartan 80 MG Oral Tab TAKE 1 TABLET BY MOUTH EVERY DAY 90 tablet 1    hydrocortisone 2.5 % External Ointment Apply 1 Application topically 2 (two) times daily. 28 g 0   [2]   Past Medical History:   Acute URI    Internal hemorrhoids    Kidney calculi    Lipid screening    Done   [3]   Past Surgical History:  Procedure Laterality Date    Colonoscopy  2007    dr. arora, repeat at age 48   [4]   Family History  Problem Relation Age of Onset    Diabetes Father     Cancer Father         skin cancer    Other (hypercholesterolemia[other]) Mother     Other (chronic LBP[other]) Mother    [5]   Social History  Socioeconomic History    Marital status:    Tobacco Use    Smoking status: Never    Smokeless tobacco: Never   Vaping Use    Vaping status: Never Used   Substance and Sexual Activity    Alcohol use: Yes     Comment: occ    Drug use: No   Other Topics Concern    Caffeine Concern Yes     Comment: 2-3 a day, ice tea    Exercise No     Comment: Active but doesn't work out    Seat Belt Yes     Social Drivers of Health     Food Insecurity: No Food Insecurity (7/3/2025)    NCSS - Food Insecurity     Worried About Running Out of Food in the Last  Year: No     Ran Out of Food in the Last Year: No   Transportation Needs: No Transportation Needs (7/3/2025)    NCSS - Transportation     Lack of Transportation: No   Housing Stability: Not At Risk (7/3/2025)    NCSS - Housing/Utilities     Has Housing: Yes     Worried About Losing Housing: No     Unable to Get Utilities: No

## (undated) NOTE — MR AVS SNAPSHOT
[address] [facility] [address]  [facility],  [address]  [address] [place] [address] [address] [address]               Thank you for choosing us for your health care visit with LUIS MANUEL Olivo.   We are glad to serve you and happy to provide you with this © [phone] [address], [address]. All rights reserved. This information is not intended as a substitute for professional medical care. Always follow your healthcare professional's instructions.          Follow Call [phone] for help. [facility] is NOT to be used for urgent needs. For medical emergencies, dial 911.            Visit [facility] online at  [employer]